# Patient Record
Sex: MALE | Race: BLACK OR AFRICAN AMERICAN | Employment: UNEMPLOYED | URBAN - METROPOLITAN AREA
[De-identification: names, ages, dates, MRNs, and addresses within clinical notes are randomized per-mention and may not be internally consistent; named-entity substitution may affect disease eponyms.]

---

## 2017-04-29 ENCOUNTER — HOSPITAL ENCOUNTER (EMERGENCY)
Age: 62
Discharge: HOME | End: 2017-04-30
Attending: EMERGENCY MEDICINE
Payer: MEDICARE

## 2017-04-29 ENCOUNTER — APPOINTMENT (OUTPATIENT)
Dept: CT IMAGING | Age: 62
End: 2017-04-29
Attending: EMERGENCY MEDICINE
Payer: MEDICARE

## 2017-04-29 VITALS
RESPIRATION RATE: 18 BRPM | HEART RATE: 90 BPM | HEIGHT: 77 IN | SYSTOLIC BLOOD PRESSURE: 110 MMHG | DIASTOLIC BLOOD PRESSURE: 70 MMHG | TEMPERATURE: 98.5 F | OXYGEN SATURATION: 99 %

## 2017-04-29 DIAGNOSIS — N28.89 RIGHT RENAL MASS: ICD-10-CM

## 2017-04-29 DIAGNOSIS — R10.9 RIGHT FLANK PAIN: ICD-10-CM

## 2017-04-29 DIAGNOSIS — R25.2 MUSCLE CRAMPS: Primary | ICD-10-CM

## 2017-04-29 LAB
ALBUMIN SERPL BCP-MCNC: 4.1 G/DL (ref 3.4–5)
ALBUMIN/GLOB SERPL: 0.8 {RATIO} (ref 0.8–1.7)
ALP SERPL-CCNC: 77 U/L (ref 45–117)
ALT SERPL-CCNC: 22 U/L (ref 16–61)
ANION GAP BLD CALC-SCNC: 13 MMOL/L (ref 3–18)
APTT PPP: 30.1 SEC (ref 23–36.4)
AST SERPL W P-5'-P-CCNC: 14 U/L (ref 15–37)
ATRIAL RATE: 103 BPM
BASOPHILS # BLD AUTO: 0.1 K/UL (ref 0–0.06)
BASOPHILS # BLD: 0 % (ref 0–2)
BILIRUB SERPL-MCNC: 0.4 MG/DL (ref 0.2–1)
BUN SERPL-MCNC: 17 MG/DL (ref 7–18)
BUN/CREAT SERPL: 2 (ref 12–20)
CALCIUM SERPL-MCNC: 9.8 MG/DL (ref 8.5–10.1)
CALCULATED P AXIS, ECG09: 43 DEGREES
CALCULATED R AXIS, ECG10: 19 DEGREES
CALCULATED T AXIS, ECG11: 35 DEGREES
CHLORIDE SERPL-SCNC: 98 MMOL/L (ref 100–108)
CK MB CFR SERPL CALC: NORMAL % (ref 0–4)
CK MB SERPL-MCNC: <1 NG/ML (ref 5–25)
CK SERPL-CCNC: 211 U/L (ref 39–308)
CO2 SERPL-SCNC: 27 MMOL/L (ref 21–32)
CREAT SERPL-MCNC: 6.89 MG/DL (ref 0.6–1.3)
DIAGNOSIS, 93000: NORMAL
DIFFERENTIAL METHOD BLD: ABNORMAL
EOSINOPHIL # BLD: 0.3 K/UL (ref 0–0.4)
EOSINOPHIL NFR BLD: 2 % (ref 0–5)
ERYTHROCYTE [DISTWIDTH] IN BLOOD BY AUTOMATED COUNT: 13.7 % (ref 11.6–14.5)
GLOBULIN SER CALC-MCNC: 4.9 G/DL (ref 2–4)
GLUCOSE SERPL-MCNC: 131 MG/DL (ref 74–99)
HCT VFR BLD AUTO: 35.7 % (ref 36–48)
HGB BLD-MCNC: 12.1 G/DL (ref 13–16)
INR PPP: 1 (ref 0.8–1.2)
LYMPHOCYTES # BLD AUTO: 23 % (ref 21–52)
LYMPHOCYTES # BLD: 3.5 K/UL (ref 0.9–3.6)
MAGNESIUM SERPL-MCNC: 1.9 MG/DL (ref 1.6–2.6)
MCH RBC QN AUTO: 31.8 PG (ref 24–34)
MCHC RBC AUTO-ENTMCNC: 33.9 G/DL (ref 31–37)
MCV RBC AUTO: 93.9 FL (ref 74–97)
MONOCYTES # BLD: 1.4 K/UL (ref 0.05–1.2)
MONOCYTES NFR BLD AUTO: 9 % (ref 3–10)
NEUTS SEG # BLD: 9.8 K/UL (ref 1.8–8)
NEUTS SEG NFR BLD AUTO: 66 % (ref 40–73)
P-R INTERVAL, ECG05: 176 MS
PHOSPHATE SERPL-MCNC: 3.2 MG/DL (ref 2.5–4.9)
PLATELET # BLD AUTO: 242 K/UL (ref 135–420)
PMV BLD AUTO: 8.9 FL (ref 9.2–11.8)
POTASSIUM SERPL-SCNC: 4 MMOL/L (ref 3.5–5.5)
PROT SERPL-MCNC: 9 G/DL (ref 6.4–8.2)
PROTHROMBIN TIME: 13.1 SEC (ref 11.5–15.2)
Q-T INTERVAL, ECG07: 384 MS
QRS DURATION, ECG06: 94 MS
QTC CALCULATION (BEZET), ECG08: 503 MS
RBC # BLD AUTO: 3.8 M/UL (ref 4.7–5.5)
SODIUM SERPL-SCNC: 138 MMOL/L (ref 136–145)
TROPONIN I SERPL-MCNC: 0.02 NG/ML (ref 0–0.04)
VENTRICULAR RATE, ECG03: 103 BPM
WBC # BLD AUTO: 15.1 K/UL (ref 4.6–13.2)

## 2017-04-29 PROCEDURE — 85610 PROTHROMBIN TIME: CPT | Performed by: EMERGENCY MEDICINE

## 2017-04-29 PROCEDURE — 85025 COMPLETE CBC W/AUTO DIFF WBC: CPT | Performed by: EMERGENCY MEDICINE

## 2017-04-29 PROCEDURE — 84100 ASSAY OF PHOSPHORUS: CPT | Performed by: EMERGENCY MEDICINE

## 2017-04-29 PROCEDURE — 80053 COMPREHEN METABOLIC PANEL: CPT | Performed by: EMERGENCY MEDICINE

## 2017-04-29 PROCEDURE — 85730 THROMBOPLASTIN TIME PARTIAL: CPT | Performed by: EMERGENCY MEDICINE

## 2017-04-29 PROCEDURE — 96374 THER/PROPH/DIAG INJ IV PUSH: CPT

## 2017-04-29 PROCEDURE — 99284 EMERGENCY DEPT VISIT MOD MDM: CPT

## 2017-04-29 PROCEDURE — 74011636320 HC RX REV CODE- 636/320: Performed by: EMERGENCY MEDICINE

## 2017-04-29 PROCEDURE — 82550 ASSAY OF CK (CPK): CPT | Performed by: EMERGENCY MEDICINE

## 2017-04-29 PROCEDURE — 93005 ELECTROCARDIOGRAM TRACING: CPT

## 2017-04-29 PROCEDURE — 83735 ASSAY OF MAGNESIUM: CPT | Performed by: EMERGENCY MEDICINE

## 2017-04-29 PROCEDURE — 71275 CT ANGIOGRAPHY CHEST: CPT

## 2017-04-29 PROCEDURE — 74011250636 HC RX REV CODE- 250/636: Performed by: EMERGENCY MEDICINE

## 2017-04-29 RX ORDER — LORAZEPAM 2 MG/ML
1 INJECTION INTRAMUSCULAR
Status: COMPLETED | OUTPATIENT
Start: 2017-04-29 | End: 2017-04-29

## 2017-04-29 RX ADMIN — IOPAMIDOL 100 ML: 755 INJECTION, SOLUTION INTRAVENOUS at 14:04

## 2017-04-29 RX ADMIN — SODIUM CHLORIDE 500 ML: 900 INJECTION, SOLUTION INTRAVENOUS at 12:40

## 2017-04-29 RX ADMIN — LORAZEPAM 1 MG: 2 INJECTION, SOLUTION INTRAMUSCULAR; INTRAVENOUS at 12:40

## 2017-04-29 NOTE — ED NOTES
PT resting in bed eyes closed RR 16 even unlabored, NAD, lights dimmed for comfort, safety intact, will continue to monitor

## 2017-04-29 NOTE — ED NOTES
PT reports generalized body cramping pain, right flank pain and right sided abdominal pain that radiates to back, Dr. Edwin Goldsmith at bedside, PT thrashing around in bed uncooperative with exam, PT asking for oxygen and fluids, PT informed that the need for assessment and treatment, PT verbalized understanding, safety intact, will continue to montior

## 2017-04-29 NOTE — DISCHARGE INSTRUCTIONS

## 2017-04-29 NOTE — ED PROVIDER NOTES
HPI Comments: 12:34 PM Berto Morse is a 63 y.o. male who presents to the ED c/o generalized body cramping that onset today. Patient had a full run of dialysis this morning with no major complications or changes. After leaving dialysis, when the patient was in his car, he suddenly began to experience full body cramping and diaphoreses. Describes sharp pain in his abdomen with radiation to the back and R side. Denies chest pain, fever, chills, and any recent illnesses. Patient states that the only medications he takes are \"renal vitamins\". No other history obtainable due to patient cooperation and acuity of condition. Nephrology: Dr. Liat Gilliam    The history is provided by the patient. Past Medical History:   Diagnosis Date    Brain bleed ([facility] [address]     Dialysis patient [facility])        No past surgical history on file. Family History:   Problem Relation Age of Onset    Heart Attack Father        Social History     Social History    Marital status: SINGLE     Spouse name: N/A    Number of children: N/A    Years of education: N/A     Occupational History    Not on file. Social History Main Topics    Smoking status: Never Smoker    Smokeless tobacco: Not on file    Alcohol use No    Drug use: No    Sexual activity: Not on file     Other Topics Concern    Not on file     Social History Narrative         ALLERGIES: Pcn [penicillins]    Review of Systems   Unable to perform ROS: Acuity of condition       Vitals:    04/29/17 1500 04/29/17 1515 04/29/17 1530 04/29/17 1600   BP: 108/61 106/63 104/70 110/70   Pulse: 90 88 88 90   Resp: 19 19 19 18   Temp:       SpO2: 98% 100% 98% 99%   Height:                Physical Exam   Constitutional: He is oriented to person, place, and time. He appears well-developed and well-nourished. He appears distressed (secondary to pain). Appears very uncomfortable. Rolling around in the bed, and trying to lay on his R side. HENT:   Head: Normocephalic and atraumatic. Eyes: Conjunctivae and EOM are normal. Pupils are equal, round, and reactive to light. No scleral icterus. Neck: Normal range of motion. Neck supple. No JVD present. No thyromegaly present. Cardiovascular: Regular rhythm, S1 normal and S2 normal.  Tachycardia present. Exam reveals no gallop and no friction rub. No murmur heard. Pulmonary/Chest: Effort normal and breath sounds normal. No accessory muscle usage. Tachypnea noted. No respiratory distress. Abdominal: Soft. Normal appearance. There is no tenderness. There is no rigidity, no rebound and no guarding. Protuberant abdomen      Musculoskeletal: Normal range of motion. He exhibits no edema. AV fistula in the R upper extremity with palpable thrill   Neurological: He is alert and oriented to person, place, and time. He has normal strength. No cranial nerve deficit or sensory deficit. Coordination normal.   Skin: Skin is warm and intact. No rash noted. He is diaphoretic. Vitals reviewed. MDM  Number of Diagnoses or Management Options  Muscle cramps:   Right flank pain:   Right renal mass:   Diagnosis management comments: Garo Gomez is a 63 y.o. Male coming in with right flank and abdominal pain as well as body and muscle cramping. Bedside US showed no aortic dissection and CTA confirms. There is a right renal mass which is known and scheduled for removal. Patient now feeling much better. Will d/c home after speaking to nephrologist after contrast administration.     ED Course       Procedures    Vitals:  Patient Vitals for the past 12 hrs:   Temp Pulse Resp BP SpO2   04/29/17 1600 - 90 18 110/70 99 %   04/29/17 1530 - 88 19 104/70 98 %   04/29/17 1515 - 88 19 106/63 100 %   04/29/17 1500 - 90 19 108/61 98 %   04/29/17 1445 - 91 19 108/66 99 %   04/29/17 1430 - 92 19 112/64 99 %   04/29/17 1330 - 94 19 109/59 100 %   04/29/17 1300 - 99 19 105/65 100 %   04/29/17 1245 - 97 17 105/58 98 %   04/29/17 1227 98.5 °F (36.9 °C) (!) 102 24 114/70 100 %     100% on RA, indicating adequate oxygenation. Medications ordered:   Medications   sodium chloride 0.9 % bolus infusion 500 mL (0 mL IntraVENous IV Completed 4/29/17 1305)   LORazepam (ATIVAN) injection 1 mg (1 mg IntraVENous Given 4/29/17 1240)   iopamidol (ISOVUE-370) 76 % injection  mL (100 mL IntraVENous Given 4/29/17 1404)         Lab findings:  Recent Results (from the past 12 hour(s))   EKG, 12 LEAD, INITIAL    Collection Time: 04/29/17 12:23 PM   Result Value Ref Range    Ventricular Rate 103 BPM    Atrial Rate 103 BPM    P-R Interval 176 ms    QRS Duration 94 ms    Q-T Interval 384 ms    QTC Calculation (Bezet) 503 ms    Calculated P Axis 43 degrees    Calculated R Axis 19 degrees    Calculated T Axis 35 degrees    Diagnosis       Sinus tachycardia  Nonspecific T wave abnormality  Abnormal ECG  No previous ECGs available  Confirmed by Liza Romeo ([id number]) on 4/29/2017 12:43:39 PM     CBC WITH AUTOMATED DIFF    Collection Time: 04/29/17 12:48 PM   Result Value Ref Range    WBC 15.1 (H) 4.6 - 13.2 K/uL    RBC 3.80 (L) 4.70 - 5.50 M/uL    HGB 12.1 (L) 13.0 - 16.0 g/dL    HCT 35.7 (L) 36.0 - 48.0 %    MCV 93.9 74.0 - 97.0 FL    MCH 31.8 24.0 - 34.0 PG    MCHC 33.9 31.0 - 37.0 g/dL    RDW 13.7 11.6 - 14.5 %    PLATELET 818 444 - 543 K/uL    MPV 8.9 (L) 9.2 - 11.8 FL    NEUTROPHILS 66 40 - 73 %    LYMPHOCYTES 23 21 - 52 %    MONOCYTES 9 3 - 10 %    EOSINOPHILS 2 0 - 5 %    BASOPHILS 0 0 - 2 %    ABS. NEUTROPHILS 9.8 (H) 1.8 - 8.0 K/UL    ABS. LYMPHOCYTES 3.5 0.9 - 3.6 K/UL    ABS. MONOCYTES 1.4 (H) 0.05 - 1.2 K/UL    ABS. EOSINOPHILS 0.3 0.0 - 0.4 K/UL    ABS.  BASOPHILS 0.1 (H) 0.0 - 0.06 K/UL    DF AUTOMATED     METABOLIC PANEL, COMPREHENSIVE    Collection Time: 04/29/17 12:48 PM   Result Value Ref Range    Sodium 138 136 - 145 mmol/L    Potassium 4.0 3.5 - 5.5 mmol/L    Chloride 98 (L) 100 - 108 mmol/L    CO2 27 21 - 32 mmol/L    Anion gap 13 3.0 - 18 mmol/L    Glucose 131 (H) 74 - 99 mg/dL BUN 17 7.0 - 18 MG/DL    Creatinine 6.89 (H) 0.6 - 1.3 MG/DL    BUN/Creatinine ratio 2 (L) 12 - 20      GFR est AA 10 (L) >60 ml/min/1.73m2    GFR est non-AA 8 (L) >60 ml/min/1.73m2    Calcium 9.8 8.5 - 10.1 MG/DL    Bilirubin, total 0.4 0.2 - 1.0 MG/DL    ALT (SGPT) 22 16 - 61 U/L    AST (SGOT) 14 (L) 15 - 37 U/L    Alk. phosphatase 77 45 - 117 U/L    Protein, total 9.0 (H) 6.4 - 8.2 g/dL    Albumin 4.1 3.4 - 5.0 g/dL    Globulin 4.9 (H) 2.0 - 4.0 g/dL    A-G Ratio 0.8 0.8 - 1.7     MAGNESIUM    Collection Time: 04/29/17 12:48 PM   Result Value Ref Range    Magnesium 1.9 1.6 - 2.6 mg/dL   PHOSPHORUS    Collection Time: 04/29/17 12:48 PM   Result Value Ref Range    Phosphorus 3.2 2.5 - 4.9 MG/DL   CARDIAC PANEL,(CK, CKMB & TROPONIN)    Collection Time: 04/29/17 12:48 PM   Result Value Ref Range     39 - 308 U/L    CK - MB <1.0 <3.6 ng/ml    CK-MB Index Cannot be calulated 0.0 - 4.0 %    Troponin-I, Qt. 0.02 0.0 - 0.045 NG/ML   PTT    Collection Time: 04/29/17 12:48 PM   Result Value Ref Range    aPTT 30.1 23.0 - 36.4 SEC   PROTHROMBIN TIME + INR    Collection Time: 04/29/17 12:48 PM   Result Value Ref Range    Prothrombin time 13.1 11.5 - 15.2 sec    INR 1.0 0.8 - 1.2         EKG interpretation by ED Physician:  5:10 PM   Sinus tachycardia with a rate of 103 bmp. Nonspecific T wave changes in the lateral leads. No acute ischemic changes. X-Ray, CT or other radiology findings or impressions:  CTA CHEST ABD PELV W WO CONT   5:01 PM   No aortic dissection or intramural hematoma identified. Coronary artery disease. Extensive bibasilar atelectasis with superimposed infiltrate not excluded. Atrophic kidneys. 3.8 cm right renal hypodensity does not meet criteria for simple cyst. Renal ultrasound correlate is advised. Underdistended urinary bladder. Diverticulosis. Progress notes, Consult notes or additional Procedure notes:   Consult:  Discussed care with Dr. Branden Lopes, [facility].  Standard discussion; including history of patients chief complaint, available diagnostic results, and treatment course. Notes that the contrast should not cause a problem over the weekend. If the patient is otherwise feeling well, he can call Monday for a follow up and potential dialysis, as his is already aware of his renal mass. 4:58 PM     Reevaluation of patient:   4:14 PM   Patient is feeling much better, resting comfortably in bed. He denies any current R flank, abdominal pain, or any muscle cramps. CT angiogram showed no evidence of aortic aneuryms or dissection, however, there was a 3 cm R renal mass. I discussed this with the patient, which he states he already knew about. Notes that it is scheduled to be removed in the next 3 weeks. Disposition:  Diagnosis:   1. Muscle cramps    2. Right flank pain    3. Right renal mass        Disposition: Discharge     Follow-up Information     Follow up With Details Comments [address] [facility] Call on 5/1/2017 For ED visit follow up, For further evaluation [address]    [address] [place] [address] EMERGENCY DEPT  As needed, If symptoms worsen [address] [place] [address]  [phone]           Patient's Medications   Start Taking    No medications on file   Continue Taking    VERAPAMIL CR (VERELAN) 360 MG CR CAPSULE    Take 1 Cap by mouth daily. WARFARIN (COUMADIN) 4 MG TABLET    Take 1 Tab by mouth daily. These Medications have changed    No medications on file   Stop Taking    No medications on file       SCRIBE ATTESTATION STATEMENT  Documented by: Christian Santos scribing for, and in the presence of, Kary Bedoya MD 12:40 PM     Signed by: Hiral Williamson, 4/29/2017, 12:40 PM    PROVIDER ATTESTATION STATEMENT  I personally performed the services described in the documentation, reviewed the documentation, as recorded by the scribe in my presence, and it accurately and completely records my words and actions.   Kary Bedoya MD